# Patient Record
Sex: MALE | Race: WHITE | NOT HISPANIC OR LATINO | Employment: PART TIME | URBAN - METROPOLITAN AREA
[De-identification: names, ages, dates, MRNs, and addresses within clinical notes are randomized per-mention and may not be internally consistent; named-entity substitution may affect disease eponyms.]

---

## 2020-02-26 PROBLEM — R73.9 HYPERGLYCEMIA: Status: ACTIVE | Noted: 2020-02-26

## 2020-02-27 ENCOUNTER — SSC ENCOUNTER (OUTPATIENT)
Dept: ADMINISTRATIVE | Facility: OTHER | Age: 35
End: 2020-02-27

## 2020-02-27 PROBLEM — E11.69 DIABETES MELLITUS TYPE 2 IN OBESE: Status: ACTIVE | Noted: 2020-02-26

## 2020-02-27 PROBLEM — L02.214 ABSCESS, GROIN: Status: ACTIVE | Noted: 2020-02-27

## 2020-02-27 PROBLEM — E66.01 MORBID OBESITY: Status: ACTIVE | Noted: 2020-02-27

## 2020-02-27 PROBLEM — E87.29 HIGH ANION GAP METABOLIC ACIDOSIS: Status: ACTIVE | Noted: 2020-02-27

## 2020-02-27 PROBLEM — E66.9 DIABETES MELLITUS TYPE 2 IN OBESE: Status: ACTIVE | Noted: 2020-02-26

## 2020-02-27 PROBLEM — R73.9 HYPERGLYCEMIA: Status: ACTIVE | Noted: 2020-02-27

## 2020-02-27 NOTE — PROGRESS NOTES
Please note the following patient's information was forwarded to the Medicaid ([place] Medicaid,  [employer], [employer], [facility], [facility]) Case Management office.    Please contact Outpatient Complex Care Management at ext [phone] with any questions.    Thank you,    Shaista Bethea, [facility]  Outpatient Case Mgmnt  [phone]

## 2022-04-15 ENCOUNTER — HOSPITAL ENCOUNTER (EMERGENCY)
Facility: HOSPITAL | Age: 37
Discharge: HOME | End: 2022-04-15
Attending: EMERGENCY MEDICINE
Payer: MEDICAID

## 2022-04-15 VITALS
SYSTOLIC BLOOD PRESSURE: 152 MMHG | BODY MASS INDEX: 42.66 KG/M2 | WEIGHT: 315 LBS | DIASTOLIC BLOOD PRESSURE: 81 MMHG | TEMPERATURE: 100 F | HEART RATE: 81 BPM | HEIGHT: 72 IN | OXYGEN SATURATION: 96 % | RESPIRATION RATE: 18 BRPM

## 2022-04-15 DIAGNOSIS — M25.511 RIGHT SHOULDER PAIN, UNSPECIFIED CHRONICITY: Primary | ICD-10-CM

## 2022-04-15 DIAGNOSIS — S49.90XA SHOULDER INJURY, INITIAL ENCOUNTER: ICD-10-CM

## 2022-04-15 PROCEDURE — 99282 EMERGENCY DEPT VISIT SF MDM: CPT | Mod: ,,, | Performed by: EMERGENCY MEDICINE

## 2022-04-15 PROCEDURE — 25000003 PHARM REV CODE 250: Performed by: EMERGENCY MEDICINE

## 2022-04-15 PROCEDURE — 99283 EMERGENCY DEPT VISIT LOW MDM: CPT

## 2022-04-15 PROCEDURE — 99282 PR EMERGENCY DEPT VISIT,LEVEL II: ICD-10-PCS | Mod: ,,, | Performed by: EMERGENCY MEDICINE

## 2022-04-15 RX ORDER — IBUPROFEN 400 MG/1
800 TABLET ORAL
Status: COMPLETED | OUTPATIENT
Start: 2022-04-15 | End: 2022-04-15

## 2022-04-15 RX ADMIN — IBUPROFEN 800 MG: 400 TABLET, FILM COATED ORAL at 07:04

## 2022-04-15 NOTE — ED NOTES
LOC: The patient is awake, alert, and oriented to self, place, time, and situation. Pt is calm and cooperative. Affect is appropriate.  Speech is appropriate and clear.     APPEARANCE: Patient resting uncomfortably, reporting right shoulder pian,  in no acute distress.  Patient is clean and well groomed.    SKIN: The skin is warm and dry; color consistent with ethnicity.  Patient has normal skin turgor and moist mucus membranes.  Skin intact; no breakdown or bruising noted.     MUSCULOSKELETAL: Patient moving upper and lower extremities without difficulty; reports  pain  In the right shoulder with mobility.   Denies weakness.     RESPIRATORY: Airway is open and patent. Respirations spontaneous, even, easy, and non-labored.  Patient has a normal effort and rate.  No accessory muscle use noted. Denies cough.     CARDIAC:  No peripheral edema noted. No complaints of chest pain.      ABDOMEN: Soft and non tender to palpation.  No distention noted. Pt denies abdominal pain; denies nausea, vomiting, diarrhea, or constipation.    NEUROLOGIC: Eyes open spontaneously.  Behavior appropriate to situation.  Follows commands; facial expression symmetrical.  Purposeful motor response noted; normal sensation in all extremities. Pt denies headache; denies lightheadedness or dizziness; denies visual disturbances; denies loss of balance; denies unilateral weakness.

## 2022-04-15 NOTE — ED TRIAGE NOTES
To the ED with c/o right shoulder pain, sustained an injury at work 3 weeks ago, catch a falling board.  Pain is always present but tolerable until yesterday.  Has ROM but with increased pain.

## 2022-04-16 NOTE — DISCHARGE INSTRUCTIONS
Take tylenol or ibuprofen if needed for pain  Follow up with orthopedic surgery.   Return to ED for any concerns

## 2022-04-16 NOTE — ED PROVIDER NOTES
Encounter Date: 4/15/2022       History     Chief Complaint   Patient presents with    Shoulder Injury     R shoulder injury 3 weeks ago and pain is increasing     HPI   35 y/o Rt hand dominant M with medical history of diabetes type 2 presents to ED complaining of Rt shoulder pain. Pt states three weeks ago he was working and a 2x12 board fell and he caught it mid air with his right hand. Had immediate pain in his rt shoulder- states when he caught the board he was in an awkward position because he was on a ladder. He has since then had Rt shoulder pain.  No direct contact of shoulder with board. He did not fall from the ladder.  He has full range of motion of his shoulder.  Over the past 3 days the pain has gotten worse. Had had relief with lidocaine patches  Has some paresthesias in hands but pt states this is because he has poorly controlled diabetes and is not related to this injury.    Review of patient's allergies indicates:  No Known Allergies     Past Medical History:   Diagnosis Date    Diabetes mellitus type 2 in obese      History reviewed. No pertinent surgical history.     Family History   Problem Relation Age of Onset    Diabetes type II Mother     Diabetes type II Sister      Social History     Tobacco Use    Smoking status: Heavy Tobacco Smoker     Packs/day: 0.50     Years: 5.00     Pack years: 2.50     Types: Cigarettes    Smokeless tobacco: Never Used   Substance Use Topics    Alcohol use: No    Drug use: No     Review of Systems   Constitutional: Negative for fever.   Eyes: Negative for visual disturbance.   Respiratory: Negative for shortness of breath.    Cardiovascular: Negative for chest pain.   Gastrointestinal: Negative for nausea and vomiting.   Musculoskeletal: Positive for arthralgias. Negative for back pain.   Skin: Negative for rash.   Neurological: Positive for headaches.       Physical Exam     Initial Vitals [04/15/22 1819]   BP Pulse Resp Temp SpO2   (!) 152/81 81 18 99.7  °F (37.6 °C) 96 %      MAP       --         Physical Exam    Nursing note and vitals reviewed.  Constitutional: He appears well-developed and well-nourished. He is not diaphoretic. No distress.   HENT:   Head: Normocephalic and atraumatic.   Neck: Neck supple.   No midline cervical ttp   Normal range of motion.  Cardiovascular: Normal rate.   Pulmonary/Chest: No respiratory distress.   Musculoskeletal:         General: No edema. Normal range of motion.      Cervical back: Normal range of motion and neck supple.      Comments: Rt shoulder no deformity, active FROM, positive supraspinatus sign, ttp over AC joint and posteriorly, neurovascualrly intact     Neurological: He is alert and oriented to person, place, and time. He has normal strength. No sensory deficit. GCS score is 15. GCS eye subscore is 4. GCS verbal subscore is 5. GCS motor subscore is 6.   Skin: Skin is warm and dry.         ED Course   Procedures  Labs Reviewed - No data to display       Imaging Results          X-Ray Shoulder Trauma 3 view Right (Final result)  Result time 04/15/22 19:37:31   Procedure changed from X-Ray Shoulder Complete 2 View Right     Final result by Priyank Tian MD (04/15/22 19:37:31)                 Impression:      1. No acute displaced fracture or dislocation of the right shoulder.      Electronically signed by: Priyank Tian MD  Date:    04/15/2022  Time:    19:37             Narrative:    EXAMINATION:  XR SHOULDER TRAUMA 3 VIEW RIGHT    CLINICAL HISTORY:  FALL;Unspecified injury of shoulder and upper arm, unspecified arm, initial encounter    TECHNIQUE:  Three or four views of the right shoulder were performed.    COMPARISON:  None    FINDINGS:  Three views right shoulder.    The right humeral head maintains appropriate relationship with the glenoid.  The acromioclavicular joint is intact.  No acute displaced fracture or dislocation of the shoulder.  No radiopaque foreign body.                              X-Rays:    Independently Interpreted Readings:   Other Readings:  XR rt shoulder- no acute fracture or dislocation    Medications   ibuprofen tablet 800 mg (800 mg Oral Given 4/15/22 9347)     Medical Decision Making:   History:   Old Medical Records: I decided to obtain old medical records.  Initial Assessment:   35 y/o M with Rt shoulder injury 3 weeks ago  Likely musculoskeleta linjury- most likely rotator cuff injury  XR to confirm no fracture or possible AC joint separation    Independently Interpreted Test(s):   I have ordered and independently interpreted X-rays - see prior notes.  Clinical Tests:   Radiological Study: Ordered and Reviewed  ED Management:  XR without acute fracture or dislocation. Offered sling but not want. Refer to PCP and ortho for further eval and referral to PT. NSAIDS for pain. All questions answered, Discharge home.                      Clinical Impression:   Final diagnoses:  [S49.90XA] Shoulder injury, initial encounter  [M25.511] Right shoulder pain, unspecified chronicity (Primary)          ED Disposition Condition    Discharge Stable        ED Prescriptions     None        Follow-up Information     Follow up With Specialties Details Why Contact [facility] Surgical Oncology, Orthopedic Surgery, Genetics, Physical Medicine and Rehabilitation, Occupational Therapy, Radiology Schedule an appointment as soon as possible for a visit   [address] [facility] [address]  [phone]             Yris Ross MD  04/16/22 3298

## 2024-01-31 ENCOUNTER — OFFICE VISIT (OUTPATIENT)
Dept: FAMILY MEDICINE | Facility: HOSPITAL | Age: 39
End: 2024-01-31
Payer: MEDICAID

## 2024-01-31 VITALS
DIASTOLIC BLOOD PRESSURE: 74 MMHG | OXYGEN SATURATION: 97 % | WEIGHT: 315 LBS | HEIGHT: 72 IN | BODY MASS INDEX: 42.66 KG/M2 | SYSTOLIC BLOOD PRESSURE: 126 MMHG | HEART RATE: 76 BPM

## 2024-01-31 DIAGNOSIS — E66.9 OBESITY, UNSPECIFIED CLASSIFICATION, UNSPECIFIED OBESITY TYPE, UNSPECIFIED WHETHER SERIOUS COMORBIDITY PRESENT: ICD-10-CM

## 2024-01-31 DIAGNOSIS — E11.9 TYPE 2 DIABETES MELLITUS WITHOUT COMPLICATION, WITHOUT LONG-TERM CURRENT USE OF INSULIN: ICD-10-CM

## 2024-01-31 DIAGNOSIS — Z72.0 CURRENTLY ATTEMPTING TO QUIT SMOKING: ICD-10-CM

## 2024-01-31 DIAGNOSIS — Z76.89 ENCOUNTER TO ESTABLISH CARE: Primary | ICD-10-CM

## 2024-01-31 PROCEDURE — 99213 OFFICE O/P EST LOW 20 MIN: CPT

## 2024-01-31 RX ORDER — VARENICLINE TARTRATE 0.5 (11)-1
KIT ORAL
Qty: 53 TABLET | Refills: 0 | Status: SHIPPED | OUTPATIENT
Start: 2024-01-31 | End: 2024-02-22 | Stop reason: SDUPTHER

## 2024-01-31 RX ORDER — SEMAGLUTIDE 0.68 MG/ML
0.5 INJECTION, SOLUTION SUBCUTANEOUS
Qty: 3 ML | Refills: 1 | Status: SHIPPED | OUTPATIENT
Start: 2024-01-31 | End: 2024-03-27

## 2024-01-31 RX ORDER — FLASH GLUCOSE SENSOR
1 KIT MISCELLANEOUS 4 TIMES DAILY
Qty: 2 KIT | Refills: 0 | Status: SHIPPED | OUTPATIENT
Start: 2024-01-31

## 2024-01-31 NOTE — PROGRESS NOTES
[facility] Family Medicine    Subjective:     Eugene Tillman is a 38 y.o. year old male with PMHx of diabetes (12.9 A1c 3 years ago), smoking who presents to clinic to establish care.    #Diabetes  Patient with history of diabetes and elevated A1c of 12.9 three years ago and currently not on insulin. Patient states that years ago he had tried metformin but discontinued due to the side effects which included nausea and diarrhea. He denies any increased thirst, increased urination, or numbness/tingling in his extremities. He states that he has never done a diabetic eye exam nor checked a diabetic kidney function in the past.     #Smoking  Patient with history of smoking 1/2 pack a day for 5 years and he wishes to quit. Had discussion with patient and he said he prefers to try chantix. He states that he is motivated to quit and make changes in his lifestyle for his health.       Patient Active Problem List    Diagnosis Date Noted    Morbid obesity 02/27/2020    High anion gap metabolic acidosis 02/27/2020    Hyperglycemia 02/27/2020    Abscess, groin 02/27/2020    Diabetes mellitus type 2 in obese 02/26/2020        Review of patient's allergies indicates:  No Known Allergies     Past Medical History:   Diagnosis Date    Diabetes mellitus type 2 in obese       No past surgical history on file.   Family History   Problem Relation Age of Onset    Diabetes type II Mother     Diabetes type II Sister       Social History     Tobacco Use    Smoking status: Heavy Smoker     Current packs/day: 0.50     Average packs/day: 0.5 packs/day for 5.0 years (2.5 ttl pk-yrs)     Types: Cigarettes    Smokeless tobacco: Never   Substance Use Topics    Alcohol use: No        Objective:     Vitals:    01/31/24 0954   BP: 126/74   Pulse: 76   SpO2: 97%   Weight: (!) 156.2 kg (344 lb 5.7 oz)   Height: 6' (1.829 m)     BMI: Body mass index is 46.7 kg/m².      Physical Exam  Vitals reviewed.   Constitutional:       General: He is not in acute distress.      Appearance: Normal appearance. He is obese. He is not ill-appearing.   HENT:      Head: Normocephalic and atraumatic.      Mouth/Throat:      Mouth: Mucous membranes are moist.      Pharynx: Oropharynx is clear.   Eyes:      General: No scleral icterus.     Extraocular Movements: Extraocular movements intact.      Pupils: Pupils are equal, round, and reactive to light.   Cardiovascular:      Rate and Rhythm: Normal rate and regular rhythm.      Pulses: Normal pulses.      Heart sounds: Normal heart sounds. No murmur heard.     No gallop.   Pulmonary:      Effort: Pulmonary effort is normal. No respiratory distress.      Breath sounds: No wheezing or rales.   Abdominal:      General: Bowel sounds are normal. There is no distension.      Tenderness: There is no abdominal tenderness. There is no guarding.   Musculoskeletal:         General: Normal range of motion.      Right lower leg: No edema.      Left lower leg: No edema.   Skin:     General: Skin is warm.      Coloration: Skin is not jaundiced.      Findings: No rash.   Neurological:      General: No focal deficit present.      Mental Status: He is alert and oriented to person, place, and time. Mental status is at baseline.      Comments: Diabetic foot exam - patient neurologically intact on all regions of foot bilaterally           Assessment/Plan:     Eugene Tillman is a 38 y.o. year old male PMHx of diabetes (12.9 A1c 3 years ago), smoking who presents to clinic to establish care.    1. Encounter to establish care  Patient has not seen doctor regularly for years, would like to establish care.   - Hemoglobin A1C; Future  - LIPID PANEL; Future  - CBC Auto Differential; Future  - Comprehensive Metabolic Panel; Future    2. Obesity, unspecified classification, unspecified obesity type, unspecified whether serious comorbidity present  - Hemoglobin A1C; Future  - LIPID PANEL; Future    3. Type 2 diabetes mellitus without complication, without long-term current use of  insulin  Patient with history of diabetes and previous A1c of 12.9 three years ago. Will repeat A1c, order CGM to assess daily blood glucose, and start ozempic. Will also order diabetic screenings  - Hemoglobin A1C; Future  - Diabetic Eye Screening Photo; Future  - Microalbumin/creatinine urine ratio  - semaglutide (OZEMPIC) 0.25 mg or 0.5 mg (2 mg/3 mL) pen injector; Inject 0.5 mg into the skin every 7 days.  Dispense: 3 mL; Refill: 1  - flash glucose sensor (FREESTYLE IDANIA 2 SENSOR) Kit; 1 each by Misc.(Non-Drug; Combo Route) route 4 (four) times daily.  Dispense: 2 kit; Refill: 0    4. Currently attempting to quit smoking  Patient with history of 1/2 pack smoking for 5 years. Wishes to quit smoking, will prescribe chantix.   - varenicline (CHANTIX STARTING MONTH BOX) 0.5 mg (11)- 1 mg (42) tablet; Take one 0.5mg tab by mouth once daily X3 days,then increase to one 0.5mg tab twice daily X4 days,then increase to one 1mg tab twice daily  Dispense: 53 tablet; Refill: 0        Follow-up:1 week - lab review, daily BG check (if CGM approved and started), medication tolerance review and consider adding long acting.     A total of 40 minutes was spent on patient care during this encounter which included chart review, examining the patient, formulating a treatment plan and documentation.     Complex medical decision making performed due to multiple medical problems addressed during the visit.     Case discussed with staff: Dr. Demetrio Mitchell MD  [facility], PGY-1  02/05/2024

## 2024-02-22 DIAGNOSIS — Z72.0 CURRENTLY ATTEMPTING TO QUIT SMOKING: ICD-10-CM

## 2024-02-23 RX ORDER — VARENICLINE TARTRATE 0.5 (11)-1
KIT ORAL
Qty: 53 TABLET | Refills: 0 | Status: SHIPPED | OUTPATIENT
Start: 2024-02-23

## 2024-02-28 NOTE — PROGRESS NOTES
I assume primary medical responsibility for this patient. I have reviewed the history, physical, and assessement & treatment plan with the resident and agree that the care is reasonable and necessary. This service has been performed by a resident without the presence of a teaching physician under the primary care exception. If necessary, an addendum of additional findings or evaluation beyond the resident documentation will be noted below.     Edelmira Atkinson MD